# Patient Record
Sex: MALE | Race: WHITE | NOT HISPANIC OR LATINO | Employment: FULL TIME | URBAN - METROPOLITAN AREA
[De-identification: names, ages, dates, MRNs, and addresses within clinical notes are randomized per-mention and may not be internally consistent; named-entity substitution may affect disease eponyms.]

---

## 2023-03-10 DIAGNOSIS — F41.8 ANXIOUS DEPRESSION: Primary | ICD-10-CM

## 2023-03-10 RX ORDER — PAROXETINE HYDROCHLORIDE 20 MG/1
TABLET, FILM COATED ORAL
Qty: 90 TABLET | Refills: 3 | Status: SHIPPED | OUTPATIENT
Start: 2023-03-10 | End: 2024-03-04

## 2023-03-24 DIAGNOSIS — K21.00 GASTROESOPHAGEAL REFLUX DISEASE WITH ESOPHAGITIS WITHOUT HEMORRHAGE: Primary | ICD-10-CM

## 2023-03-24 RX ORDER — PANTOPRAZOLE SODIUM 40 MG/1
TABLET, DELAYED RELEASE ORAL
Qty: 90 TABLET | Refills: 3 | Status: SHIPPED | OUTPATIENT
Start: 2023-03-24 | End: 2024-06-03

## 2023-03-26 PROBLEM — L70.9 ADULT ACNE: Status: ACTIVE | Noted: 2023-03-26

## 2023-03-26 PROBLEM — A08.39 GASTROENTERITIS DUE TO COVID-19 VIRUS: Status: ACTIVE | Noted: 2023-03-26

## 2023-03-26 PROBLEM — J32.9 SINOBRONCHITIS: Status: ACTIVE | Noted: 2023-03-26

## 2023-03-26 PROBLEM — J31.0 CHRONIC RHINITIS: Status: ACTIVE | Noted: 2023-03-26

## 2023-03-26 PROBLEM — J04.0 LARYNGITIS: Status: ACTIVE | Noted: 2023-03-26

## 2023-03-26 PROBLEM — R63.5 WEIGHT GAIN: Status: ACTIVE | Noted: 2023-03-26

## 2023-03-26 PROBLEM — U07.1 GASTROENTERITIS DUE TO COVID-19 VIRUS: Status: ACTIVE | Noted: 2023-03-26

## 2023-03-26 PROBLEM — R05.8 ALLERGIC COUGH: Status: ACTIVE | Noted: 2023-03-26

## 2023-03-26 PROBLEM — B96.89 SINUSITIS, BACTERIAL: Status: ACTIVE | Noted: 2023-03-26

## 2023-03-26 PROBLEM — M79.10 MYALGIA: Status: ACTIVE | Noted: 2023-03-26

## 2023-03-26 PROBLEM — F17.200 TOBACCO DEPENDENCE: Status: ACTIVE | Noted: 2023-03-26

## 2023-03-26 PROBLEM — R11.2 NAUSEA, VOMITING, AND DIARRHEA: Status: ACTIVE | Noted: 2023-03-26

## 2023-03-26 PROBLEM — E86.0 DEHYDRATION, MODERATE: Status: ACTIVE | Noted: 2023-03-26

## 2023-03-26 PROBLEM — E78.5 HYPERLIPIDEMIA: Status: ACTIVE | Noted: 2023-03-26

## 2023-03-26 PROBLEM — I10 HYPERTENSION: Status: ACTIVE | Noted: 2023-03-26

## 2023-03-26 PROBLEM — K12.0 APHTHOUS ULCER OF TONGUE: Status: ACTIVE | Noted: 2023-03-26

## 2023-03-26 PROBLEM — M54.12 CERVICAL NEURITIS: Status: ACTIVE | Noted: 2023-03-26

## 2023-03-26 PROBLEM — L21.9 SEBORRHEIC DERMATITIS OF SCALP: Status: ACTIVE | Noted: 2023-03-26

## 2023-03-26 PROBLEM — R00.0 SINUS TACHYCARDIA: Status: ACTIVE | Noted: 2023-03-26

## 2023-03-26 PROBLEM — K21.9 GERD (GASTROESOPHAGEAL REFLUX DISEASE): Status: ACTIVE | Noted: 2023-03-26

## 2023-03-26 PROBLEM — R53.83 FATIGUE: Status: ACTIVE | Noted: 2023-03-26

## 2023-03-26 PROBLEM — J30.9 ALLERGIC RHINITIS: Status: ACTIVE | Noted: 2023-03-26

## 2023-03-26 PROBLEM — E55.9 VITAMIN D DEFICIENCY: Status: ACTIVE | Noted: 2023-03-26

## 2023-03-26 PROBLEM — J40 SINOBRONCHITIS: Status: ACTIVE | Noted: 2023-03-26

## 2023-03-26 PROBLEM — S46.911A SHOULDER STRAIN, RIGHT, INITIAL ENCOUNTER: Status: ACTIVE | Noted: 2023-03-26

## 2023-03-26 PROBLEM — J42 CHRONIC BRONCHITIS (MULTI): Status: ACTIVE | Noted: 2023-03-26

## 2023-03-26 PROBLEM — T63.461A WASP STING: Status: ACTIVE | Noted: 2023-03-26

## 2023-03-26 PROBLEM — F32.A DEPRESSION: Status: ACTIVE | Noted: 2023-03-26

## 2023-03-26 PROBLEM — R19.7 NAUSEA, VOMITING, AND DIARRHEA: Status: ACTIVE | Noted: 2023-03-26

## 2023-03-26 PROBLEM — M77.8 CAPSULITIS OF RIGHT SHOULDER: Status: ACTIVE | Noted: 2023-03-26

## 2023-03-26 PROBLEM — J32.9 SINUSITIS, BACTERIAL: Status: ACTIVE | Noted: 2023-03-26

## 2023-03-26 RX ORDER — ONDANSETRON 4 MG/1
1-2 TABLET, FILM COATED ORAL AS NEEDED
COMMUNITY
Start: 2022-10-06

## 2023-03-26 RX ORDER — METOPROLOL SUCCINATE 25 MG/1
1 TABLET, EXTENDED RELEASE ORAL DAILY
COMMUNITY
Start: 2015-11-19 | End: 2023-05-09

## 2023-03-26 RX ORDER — CLINDAMYCIN PHOSPHATE AND BENZOYL PEROXIDE 10; 50 MG/G; MG/G
GEL TOPICAL 2 TIMES DAILY
COMMUNITY
Start: 2021-09-02

## 2023-03-26 RX ORDER — NEOMYCIN SULFATE, POLYMYXIN B SULFATE AND DEXAMETHASONE 3.5; 10000; 1 MG/ML; [USP'U]/ML; MG/ML
2 SUSPENSION/ DROPS OPHTHALMIC 2 TIMES DAILY
COMMUNITY
Start: 2022-07-25 | End: 2023-03-30 | Stop reason: ALTCHOICE

## 2023-03-26 RX ORDER — AMLODIPINE AND BENAZEPRIL HYDROCHLORIDE 5; 20 MG/1; MG/1
1 CAPSULE ORAL DAILY
COMMUNITY
Start: 2014-07-03 | End: 2023-05-09

## 2023-03-26 RX ORDER — FLUTICASONE PROPIONATE 50 MCG
2 SPRAY, SUSPENSION (ML) NASAL DAILY
COMMUNITY
Start: 2020-08-24 | End: 2023-03-31 | Stop reason: SDUPTHER

## 2023-03-26 RX ORDER — DEXAMETHASONE 6 MG/1
6 TABLET ORAL 2 TIMES DAILY
COMMUNITY
Start: 2022-10-06 | End: 2023-09-27 | Stop reason: ALTCHOICE

## 2023-03-26 RX ORDER — ERGOCALCIFEROL 1.25 MG/1
1 CAPSULE ORAL
COMMUNITY

## 2023-03-30 ENCOUNTER — OFFICE VISIT (OUTPATIENT)
Dept: PRIMARY CARE | Facility: CLINIC | Age: 53
End: 2023-03-30
Payer: COMMERCIAL

## 2023-03-30 VITALS
DIASTOLIC BLOOD PRESSURE: 88 MMHG | BODY MASS INDEX: 42.21 KG/M2 | WEIGHT: 285 LBS | SYSTOLIC BLOOD PRESSURE: 140 MMHG | HEART RATE: 94 BPM | OXYGEN SATURATION: 95 % | RESPIRATION RATE: 18 BRPM | HEIGHT: 69 IN | TEMPERATURE: 97.9 F

## 2023-03-30 DIAGNOSIS — J32.9 SINOBRONCHITIS: ICD-10-CM

## 2023-03-30 DIAGNOSIS — J01.01 ACUTE RECURRENT MAXILLARY SINUSITIS: ICD-10-CM

## 2023-03-30 DIAGNOSIS — F17.200 TOBACCO DEPENDENCE: ICD-10-CM

## 2023-03-30 DIAGNOSIS — J30.9 ALLERGIC RHINITIS, UNSPECIFIED SEASONALITY, UNSPECIFIED TRIGGER: ICD-10-CM

## 2023-03-30 DIAGNOSIS — J40 SINOBRONCHITIS: ICD-10-CM

## 2023-03-30 DIAGNOSIS — M54.12 CERVICAL NEURITIS: ICD-10-CM

## 2023-03-30 DIAGNOSIS — J40 BRONCHITIS: ICD-10-CM

## 2023-03-30 DIAGNOSIS — H60.311 CHRONIC DIFFUSE OTITIS EXTERNA OF RIGHT EAR: Primary | ICD-10-CM

## 2023-03-30 DIAGNOSIS — J41.0 SIMPLE CHRONIC BRONCHITIS (MULTI): ICD-10-CM

## 2023-03-30 DIAGNOSIS — R05.8 ALLERGIC COUGH: ICD-10-CM

## 2023-03-30 DIAGNOSIS — J31.0 CHRONIC RHINITIS: ICD-10-CM

## 2023-03-30 DIAGNOSIS — K21.00 GASTROESOPHAGEAL REFLUX DISEASE WITH ESOPHAGITIS WITHOUT HEMORRHAGE: ICD-10-CM

## 2023-03-30 PROCEDURE — 3077F SYST BP >= 140 MM HG: CPT | Performed by: FAMILY MEDICINE

## 2023-03-30 PROCEDURE — 96372 THER/PROPH/DIAG INJ SC/IM: CPT | Performed by: FAMILY MEDICINE

## 2023-03-30 PROCEDURE — 99214 OFFICE O/P EST MOD 30 MIN: CPT | Performed by: FAMILY MEDICINE

## 2023-03-30 PROCEDURE — 3079F DIAST BP 80-89 MM HG: CPT | Performed by: FAMILY MEDICINE

## 2023-03-30 RX ORDER — CIPROFLOXACIN AND DEXAMETHASONE 3; 1 MG/ML; MG/ML
4 SUSPENSION/ DROPS AURICULAR (OTIC) 2 TIMES DAILY
Qty: 7.5 ML | Refills: 0 | Status: SHIPPED | OUTPATIENT
Start: 2023-03-30 | End: 2023-03-31 | Stop reason: SDUPTHER

## 2023-03-30 RX ORDER — MOMETASONE FUROATE 50 UG/1
1 SPRAY, METERED NASAL 2 TIMES DAILY
Qty: 17 G | Refills: 3 | Status: SHIPPED | OUTPATIENT
Start: 2023-03-30 | End: 2023-09-27 | Stop reason: SDUPTHER

## 2023-03-30 RX ORDER — CEPHALEXIN 500 MG/1
500 CAPSULE ORAL 2 TIMES DAILY
Qty: 20 CAPSULE | Refills: 0 | Status: SHIPPED | OUTPATIENT
Start: 2023-03-30 | End: 2023-03-31 | Stop reason: SDUPTHER

## 2023-03-30 RX ORDER — ACETAMINOPHEN 160 MG
TABLET,CHEWABLE ORAL
COMMUNITY

## 2023-03-30 ASSESSMENT — PAIN SCALES - GENERAL: PAINLEVEL: 6

## 2023-03-30 NOTE — PROGRESS NOTES
Subjective   Julian Morgan is a 52 y.o. male who presents for Follow-up.  HPI  ; patient is a 52-year-old male who has chronic sinus problems and upper respiratory congestion with cough.  He also states his right ear has been hurting and he was hoping to get some eardrops.  Patient uses Nasonex nasal mist but needs a refill on the prescription for his chronic sinus problems.  He also was hoping to get an allergy shot since the weather change affects his sinuses and seasonal allergies.  Patient works in a [employer] as a  and he does have chronic sinusitis and constant upper respiratory lung congestion.  He does have an albuterol inhaler at home that he uses periodically.  Patient is also a smoker and I have encouraged him to try to quit.        Objective ROS  ;10 systems were reviewed and the information is included in the HPI and no additional review of systems is indicated.    Physical Exam  Vitals and nursing note reviewed.   Constitutional:       Appearance: Normal appearance. He is normal weight.   HENT:      Head: Normocephalic.      Right Ear: External ear normal.      Left Ear: Tympanic membrane and external ear normal.      Ears:      Comments: Right external canal is irritated and the right TM is also inflamed.     Nose: Congestion present.      Comments: Nasal mucosal congestion watery drainage.     Mouth/Throat:      Mouth: Mucous membranes are moist.      Pharynx: Oropharynx is clear.      Comments: Posterior pharyngeal erythema and post sinus drainage.  Eyes:      Extraocular Movements: Extraocular movements intact.      Conjunctiva/sclera: Conjunctivae normal.      Pupils: Pupils are equal, round, and reactive to light.   Neck:      Comments: Cervical neck spasm and restriction of motion.  Bilateral cervical muscle spasm.  Cardiovascular:      Rate and Rhythm: Normal rate and regular rhythm.      Pulses: Normal pulses.      Heart sounds: Normal heart sounds.      Comments:  Heart rhythm is stable S1 and S2 are noted, no ectopics.  Pulmonary:      Effort: Pulmonary effort is normal.      Breath sounds: Rhonchi present.      Comments: Bilateral rhonchi due to sinus drainage and also tobacco.  Abdominal:      General: Bowel sounds are normal.      Palpations: Abdomen is soft.      Comments: Moderately obese and nontender to palpation.  Patient does like his beer.   Genitourinary:     Comments: Not examined  Musculoskeletal:         General: Tenderness present. Normal range of motion.      Cervical back: Normal range of motion. Tenderness present.      Comments: Patient has arthritis in all his joints.  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  He also has restricted range of motion lumbar spine due to DJD and spasm.  No ankle edema.   Skin:     General: Skin is warm.   Neurological:      General: No focal deficit present.      Mental Status: He is alert and oriented to person, place, and time. Mental status is at baseline.      Comments: Occasional sciatica pain from low back spasm.  No neurosensory deficits in the upper extremities.   Psychiatric:         Mood and Affect: Mood normal.         Behavior: Behavior normal.         Thought Content: Thought content normal.         Judgment: Judgment normal.      Comments: Patient has a flat affect but normal mood.  Normal judgment and good thought process.  Pleasant when you talk to him.  He is shy.     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  He was given 4 mg dexamethasone IM for his rhinitis, he was started on antibiotics for sinusitis.  He also was given some eardrops for right otitis externa.  Patient needed a prescription for his Nasonex nose spray.  He does suffer with hypertension as well and he tries to watch his weight.   Patient will have his blood work rechecked next visit and he will follow-up in 3 or 4 months or sooner if needed.    Problem List Items Addressed This Visit    None  Visit Diagnoses       Chronic diffuse otitis externa of right ear    -  Primary    Relevant Medications    ciprofloxacin-dexamethasone (CiproDEX) otic suspension    Acute recurrent maxillary sinusitis        Relevant Medications    mometasone (Nasonex) 50 mcg/actuation nasal spray    cephalexin (Keflex) 500 mg capsule                 Jose Luis Cuenca, DO

## 2023-03-31 ENCOUNTER — TELEPHONE (OUTPATIENT)
Dept: PRIMARY CARE | Facility: CLINIC | Age: 53
End: 2023-03-31
Payer: COMMERCIAL

## 2023-03-31 DIAGNOSIS — H60.311 CHRONIC DIFFUSE OTITIS EXTERNA OF RIGHT EAR: ICD-10-CM

## 2023-03-31 DIAGNOSIS — J01.01 ACUTE RECURRENT MAXILLARY SINUSITIS: ICD-10-CM

## 2023-03-31 RX ORDER — CEPHALEXIN 500 MG/1
500 CAPSULE ORAL 2 TIMES DAILY
Qty: 20 CAPSULE | Refills: 0 | Status: SHIPPED | OUTPATIENT
Start: 2023-03-31 | End: 2023-04-10

## 2023-03-31 RX ORDER — CIPROFLOXACIN AND DEXAMETHASONE 3; 1 MG/ML; MG/ML
4 SUSPENSION/ DROPS AURICULAR (OTIC) 2 TIMES DAILY
Qty: 7.5 ML | Refills: 0 | Status: SHIPPED | OUTPATIENT
Start: 2023-03-31 | End: 2023-04-07

## 2023-03-31 RX ORDER — FLUTICASONE PROPIONATE 50 MCG
2 SPRAY, SUSPENSION (ML) NASAL DAILY
Qty: 16 G | Refills: 3 | Status: SHIPPED | OUTPATIENT
Start: 2023-03-31

## 2023-05-09 DIAGNOSIS — I10 PRIMARY HYPERTENSION: Primary | ICD-10-CM

## 2023-05-09 RX ORDER — AMLODIPINE AND BENAZEPRIL HYDROCHLORIDE 5; 20 MG/1; MG/1
CAPSULE ORAL
Qty: 90 CAPSULE | Refills: 3 | Status: SHIPPED | OUTPATIENT
Start: 2023-05-09 | End: 2024-05-03

## 2023-05-09 RX ORDER — METOPROLOL SUCCINATE 25 MG/1
TABLET, EXTENDED RELEASE ORAL
Qty: 90 TABLET | Refills: 3 | Status: SHIPPED | OUTPATIENT
Start: 2023-05-09 | End: 2024-05-03

## 2023-09-27 ENCOUNTER — OFFICE VISIT (OUTPATIENT)
Dept: PRIMARY CARE | Facility: CLINIC | Age: 53
End: 2023-09-27
Payer: COMMERCIAL

## 2023-09-27 VITALS
BODY MASS INDEX: 41.92 KG/M2 | RESPIRATION RATE: 18 BRPM | HEIGHT: 69 IN | SYSTOLIC BLOOD PRESSURE: 148 MMHG | HEART RATE: 99 BPM | OXYGEN SATURATION: 95 % | WEIGHT: 283 LBS | DIASTOLIC BLOOD PRESSURE: 90 MMHG | TEMPERATURE: 98.3 F

## 2023-09-27 DIAGNOSIS — B37.9 YEAST INFECTION: ICD-10-CM

## 2023-09-27 DIAGNOSIS — J40 SINOBRONCHITIS: ICD-10-CM

## 2023-09-27 DIAGNOSIS — E55.9 VITAMIN D DEFICIENCY: ICD-10-CM

## 2023-09-27 DIAGNOSIS — K21.00 GASTROESOPHAGEAL REFLUX DISEASE WITH ESOPHAGITIS WITHOUT HEMORRHAGE: ICD-10-CM

## 2023-09-27 DIAGNOSIS — J32.9 SINOBRONCHITIS: ICD-10-CM

## 2023-09-27 DIAGNOSIS — Z00.00 PHYSICAL EXAM, ANNUAL: Primary | ICD-10-CM

## 2023-09-27 DIAGNOSIS — E66.01 CLASS 3 SEVERE OBESITY DUE TO EXCESS CALORIES WITHOUT SERIOUS COMORBIDITY WITH BODY MASS INDEX (BMI) OF 40.0 TO 44.9 IN ADULT (MULTI): ICD-10-CM

## 2023-09-27 DIAGNOSIS — J01.01 ACUTE RECURRENT MAXILLARY SINUSITIS: ICD-10-CM

## 2023-09-27 DIAGNOSIS — I10 PRIMARY HYPERTENSION: ICD-10-CM

## 2023-09-27 DIAGNOSIS — M53.86 SCIATICA ASSOCIATED WITH DISORDER OF LUMBAR SPINE: ICD-10-CM

## 2023-09-27 DIAGNOSIS — J41.1 MUCOPURULENT CHRONIC BRONCHITIS (MULTI): ICD-10-CM

## 2023-09-27 PROBLEM — E66.813 CLASS 3 SEVERE OBESITY DUE TO EXCESS CALORIES WITHOUT SERIOUS COMORBIDITY WITH BODY MASS INDEX (BMI) OF 40.0 TO 44.9 IN ADULT: Status: ACTIVE | Noted: 2023-09-27

## 2023-09-27 LAB
ALANINE AMINOTRANSFERASE (SGPT) (U/L) IN SER/PLAS: 18 U/L (ref 10–52)
ALBUMIN (G/DL) IN SER/PLAS: 4.1 G/DL (ref 3.4–5)
ALKALINE PHOSPHATASE (U/L) IN SER/PLAS: 67 U/L (ref 33–120)
ANION GAP IN SER/PLAS: 14 MMOL/L (ref 10–20)
APPEARANCE UR: CLEAR
ASPARTATE AMINOTRANSFERASE (SGOT) (U/L) IN SER/PLAS: 37 U/L (ref 9–39)
BILIRUB UR QL STRIP: NEGATIVE
BILIRUBIN TOTAL (MG/DL) IN SER/PLAS: 0.8 MG/DL (ref 0–1.2)
CALCIDIOL (25 OH VITAMIN D3) (NG/ML) IN SER/PLAS: 28 NG/ML
CALCIUM (MG/DL) IN SER/PLAS: 9.9 MG/DL (ref 8.6–10.6)
CARBON DIOXIDE, TOTAL (MMOL/L) IN SER/PLAS: 30 MMOL/L (ref 21–32)
CHLORIDE (MMOL/L) IN SER/PLAS: 95 MMOL/L (ref 98–107)
CHOLESTEROL (MG/DL) IN SER/PLAS: 221 MG/DL (ref 0–199)
CHOLESTEROL IN HDL (MG/DL) IN SER/PLAS: 59.2 MG/DL
CHOLESTEROL/HDL RATIO: 3.7
COLOR UR: YELLOW
CREATININE (MG/DL) IN SER/PLAS: 0.94 MG/DL (ref 0.5–1.3)
ERYTHROCYTE DISTRIBUTION WIDTH (RATIO) BY AUTOMATED COUNT: 13.2 % (ref 11.5–14.5)
ERYTHROCYTE MEAN CORPUSCULAR HEMOGLOBIN CONCENTRATION (G/DL) BY AUTOMATED: 32.9 G/DL (ref 32–36)
ERYTHROCYTE MEAN CORPUSCULAR VOLUME (FL) BY AUTOMATED COUNT: 101 FL (ref 80–100)
ERYTHROCYTES (10*6/UL) IN BLOOD BY AUTOMATED COUNT: 4.99 X10E12/L (ref 4.5–5.9)
GFR MALE: >90 ML/MIN/1.73M2
GLUCOSE (MG/DL) IN SER/PLAS: 100 MG/DL (ref 74–99)
GLUCOSE UR STRIP-MCNC: NEGATIVE MG/DL
HEMATOCRIT (%) IN BLOOD BY AUTOMATED COUNT: 50.2 % (ref 41–52)
HEMOGLOBIN (G/DL) IN BLOOD: 16.5 G/DL (ref 13.5–17.5)
HGB UR QL STRIP: NEGATIVE
KETONES UR STRIP-MCNC: NEGATIVE MG/DL
LDL: 149 MG/DL (ref 0–99)
LEUKOCYTE ESTERASE UR QL STRIP: NEGATIVE
LEUKOCYTES (10*3/UL) IN BLOOD BY AUTOMATED COUNT: 8.9 X10E9/L (ref 4.4–11.3)
NITRITE UR QL STRIP: NEGATIVE
NRBC (PER 100 WBCS) BY AUTOMATED COUNT: 0 /100 WBC (ref 0–0)
PH UR STRIP: 6 [PH]
PLATELETS (10*3/UL) IN BLOOD AUTOMATED COUNT: 295 X10E9/L (ref 150–450)
POTASSIUM (MMOL/L) IN SER/PLAS: 4.8 MMOL/L (ref 3.5–5.3)
PROSTATE SPECIFIC ANTIGEN,SCREEN: 0.63 NG/ML (ref 0–4)
PROT UR STRIP-MCNC: NEGATIVE MG/DL
PROTEIN TOTAL: 7.3 G/DL (ref 6.4–8.2)
SODIUM (MMOL/L) IN SER/PLAS: 134 MMOL/L (ref 136–145)
SP GR UR STRIP.AUTO: 1.01
THYROTROPIN (MIU/L) IN SER/PLAS BY DETECTION LIMIT <= 0.05 MIU/L: 0.76 MIU/L (ref 0.44–3.98)
TRIGLYCERIDE (MG/DL) IN SER/PLAS: 62 MG/DL (ref 0–149)
UREA NITROGEN (MG/DL) IN SER/PLAS: 11 MG/DL (ref 6–23)
UROBILINOGEN UR STRIP-ACNC: 0.2 E.U./DL
VLDL: 12 MG/DL (ref 0–40)

## 2023-09-27 PROCEDURE — 93000 ELECTROCARDIOGRAM COMPLETE: CPT | Performed by: FAMILY MEDICINE

## 2023-09-27 PROCEDURE — 3008F BODY MASS INDEX DOCD: CPT | Performed by: FAMILY MEDICINE

## 2023-09-27 PROCEDURE — 80053 COMPREHEN METABOLIC PANEL: CPT

## 2023-09-27 PROCEDURE — 3080F DIAST BP >= 90 MM HG: CPT | Performed by: FAMILY MEDICINE

## 2023-09-27 PROCEDURE — 99396 PREV VISIT EST AGE 40-64: CPT | Performed by: FAMILY MEDICINE

## 2023-09-27 PROCEDURE — 81003 URINALYSIS AUTO W/O SCOPE: CPT | Performed by: FAMILY MEDICINE

## 2023-09-27 PROCEDURE — 84443 ASSAY THYROID STIM HORMONE: CPT

## 2023-09-27 PROCEDURE — 84153 ASSAY OF PSA TOTAL: CPT

## 2023-09-27 PROCEDURE — 82306 VITAMIN D 25 HYDROXY: CPT

## 2023-09-27 PROCEDURE — 3077F SYST BP >= 140 MM HG: CPT | Performed by: FAMILY MEDICINE

## 2023-09-27 PROCEDURE — 80061 LIPID PANEL: CPT

## 2023-09-27 PROCEDURE — 85027 COMPLETE CBC AUTOMATED: CPT

## 2023-09-27 RX ORDER — CLOTRIMAZOLE AND BETAMETHASONE DIPROPIONATE 10; .64 MG/G; MG/G
1 CREAM TOPICAL 2 TIMES DAILY
Qty: 45 G | Refills: 3 | Status: SHIPPED | OUTPATIENT
Start: 2023-09-27 | End: 2023-09-27

## 2023-09-27 RX ORDER — CLOTRIMAZOLE AND BETAMETHASONE DIPROPIONATE 10; .64 MG/G; MG/G
1 CREAM TOPICAL 2 TIMES DAILY
Qty: 45 G | Refills: 3 | Status: SHIPPED | OUTPATIENT
Start: 2023-09-27 | End: 2023-11-26

## 2023-09-27 RX ORDER — MOMETASONE FUROATE 50 UG/1
1 SPRAY, METERED NASAL 2 TIMES DAILY
Qty: 17 G | Refills: 3 | Status: SHIPPED | OUTPATIENT
Start: 2023-09-27 | End: 2024-09-26

## 2023-09-27 RX ORDER — OMEPRAZOLE 40 MG/1
40 CAPSULE, DELAYED RELEASE ORAL
Qty: 90 CAPSULE | Refills: 3 | Status: SHIPPED | OUTPATIENT
Start: 2023-09-27 | End: 2024-09-26

## 2023-09-27 RX ORDER — AMOXICILLIN AND CLAVULANATE POTASSIUM 500; 125 MG/1; MG/1
500 TABLET, FILM COATED ORAL 3 TIMES DAILY
Qty: 30 TABLET | Refills: 0 | Status: SHIPPED | OUTPATIENT
Start: 2023-09-27 | End: 2023-10-07

## 2023-09-27 ASSESSMENT — PAIN SCALES - GENERAL: PAINLEVEL: 8

## 2023-09-27 ASSESSMENT — PATIENT HEALTH QUESTIONNAIRE - PHQ9
SUM OF ALL RESPONSES TO PHQ9 QUESTIONS 1 AND 2: 0
2. FEELING DOWN, DEPRESSED OR HOPELESS: NOT AT ALL
1. LITTLE INTEREST OR PLEASURE IN DOING THINGS: NOT AT ALL

## 2023-09-27 NOTE — PROGRESS NOTES
Subjective   Julian Morgan is a 53 y.o. male who presents for Annual Exam (Patient here for annual physical exam today).    HPI  : Patient is a 53-year-old male who is in for a physical exam.  Patient will give us a urine specimen, have an EKG and complete blood work.  He also is complaining of sinus congestion and cough.  Patient does have chronic bronchitis and chronic sinus issues.  He also has low back pain and some left sciatica.  He was hoping to get a cortisone shot for his sciatica and he also needs refill on several of his medications.  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  He has had nerve blocks in the past and may need to go back for more nerve blocks if his back continues to bother him.  He did not want any spinal surgery at that time.      Objective  : ROS : 10 systems were reviewed and the information is included in the HPI and no additional review of systems is indicated.    Physical Exam  Vitals and nursing note reviewed.   Constitutional:       Appearance: Normal appearance. He is obese.      Comments: Patient is alert and oriented x3.      HENT:      Head: Normocephalic.      Right Ear: Tympanic membrane and external ear normal.      Left Ear: Tympanic membrane and external ear normal.      Ears:      Comments: Ears are patent bilaterally and TMs are clear.     Nose: Congestion present.      Comments: Nasal mucosal congestion and postnasal drainage.  Chronic sinus problems which affect his breathing.     Mouth/Throat:      Mouth: Mucous membranes are dry.      Comments: Mouth is dry, tongue is midline.  Moderate  posterior pharyngeal erythema and post sinus drainage in the posterior pharynx.  Eyes:      Extraocular Movements: Extraocular movements intact.      Conjunctiva/sclera: Conjunctivae normal.      Pupils: Pupils are equal, round, and reactive to light.      Comments: Patient denies  visual disturbance but does have itchy eyes   from allergies, and needs an eye exam soon.   Neck:      Comments: Patient has a short stout neck with restriction of motion.  No carotid bruits, no thyromegaly, no cervical adenopathy.    Cardiovascular:      Rate and Rhythm: Regular rhythm. Tachycardia present.      Pulses: Normal pulses.      Heart sounds: Normal heart sounds.      Comments: Patient does develop some rib pain from coughing.  Denies any substernal chest pain.  Also is on 2 medications for hypertension.  He will have an EKG today.  Pulmonary:      Effort: Pulmonary effort is normal.      Breath sounds: Wheezing and rhonchi present.      Comments: Patient does have scattered rhonchi and expiratory wheezes and a cough with deep inspiration.  He is a tobacco user.  Abdominal:      General: Bowel sounds are normal.      Palpations: Abdomen is soft.      Comments: Abdomen is soft and obese and difficult to evaluate due to obesity.  He does have chronic reflux esophagitis.  States the Protonix is not helping and would like to try Nexium.  Denies any abdominal guarding or rebound tenderness.  Denies diarrhea.   Genitourinary:     Comments: Patient denies dysuria, no hematuria,  denies flank pain.  Occasional nocturia.  Musculoskeletal:         General: Tenderness present.      Comments: Patient does strenuous labor at work as a  and has chronic lumbosacral disc problems.  He has had nerve blocks in the past and may need to be sent for more soon.  He has lumbosacral disc disease with left sciatica..  Osteoarthritis of the hips and knees.   Skin:     General: Skin is warm and dry.      Findings: Rash (Yeast type infection on his feet.) present.      Comments: Patient has a yeast type dermatitis on his feet.  He will be prescribed Lotrisone cream.     Neurological:      General: No focal deficit present.      Mental Status: He is alert and oriented to person, place, and time. Mental status is at  baseline.      Sensory: Sensory deficit present.      Comments: Lumbosacral disc disease with left sciatica.  Paresthesias intermittently in both legs.   coordination and gait are stable.  Normal muscle strength upper and lower extremities.   Psychiatric:         Behavior: Behavior normal.         Thought Content: Thought content normal.         Judgment: Judgment normal.      Comments: Patient has flat affect, denies anxiety or depression.  Thought content and judgment are stable.  No signs of vascular dementia.  Behavior is normal.     PLAN : Patient is a 53-year-old male who was evaluated today for a annual physical.  Urine showed a pH of 6.0, specific gravity 1.015, negative leukocytes, negative protein, negative blood, negative glucose.  EKG showed sinus tachycardia at a rate of 100, no acute changes are noted, nonspecific ST-T wave changes normal ECG.  Patient had his blood work drawn and will be notified of the results in 4 days and further recommendations will be made at that time.  He also received 4 mg dexamethasone +2 mL lidocaine in the left lumbosacral trigger point.  Patient was also prescribed antibiotics for his sinusitis and bronchitis.  He needed refills on some of his medications and he was also given Lotrisone cream for the yeast infection on his feet.  Patient will also try omeprazole 40 mg daily for his gastroesophageal reflux disease.  Further recommendations will be made pending the blood work results.    Problem List Items Addressed This Visit       Chronic bronchitis (CMS/HCC)    GERD (gastroesophageal reflux disease)    Relevant Medications    omeprazole (PriLOSEC) 40 mg DR capsule    Hypertension    Vitamin D deficiency    Relevant Orders    Vitamin D 25-Hydroxy,Total (for eval of Vitamin D levels)    Sinobronchitis    Acute recurrent maxillary sinusitis    Relevant Medications    amoxicillin-pot clavulanate (Augmentin) 500-125 mg tablet    mometasone (Nasonex) 50 mcg/actuation nasal  spray    Yeast infection    Relevant Medications    clotrimazole-betamethasone (Lotrisone) cream    Physical exam, annual - Primary    Relevant Orders    CBC    Comprehensive Metabolic Panel    Lipid Panel    POCT fingerstick glucose manually resulted    Prostate Specific Antigen, Screen    Thyroid Stimulating Hormone    POCT UA (Automated) docked device    ECG 12 Lead    Sciatica associated with disorder of lumbar spine            Jose Luis Cuenca, DO

## 2023-09-29 NOTE — RESULT ENCOUNTER NOTE
Cholesterol is 221 and should be under 200          bad cholesterol is 149 and should be under 100       triglycerides are normal at 62       blood sugar is normal at 100       kidney and liver function are normal     vitamin D is a little low at 28        it should be above 30       patient should take 3000 units of over-the-counter vitamin D daily           Red and white blood cell counts are normal         thyroid function is normal       Prostate level is normal at 0.63.  Blood work looks stable just try to watch the diet and the cholesterol.

## 2023-10-17 ENCOUNTER — OFFICE VISIT (OUTPATIENT)
Dept: PRIMARY CARE | Facility: CLINIC | Age: 53
End: 2023-10-17
Payer: COMMERCIAL

## 2023-10-17 VITALS
TEMPERATURE: 98.7 F | HEIGHT: 69 IN | DIASTOLIC BLOOD PRESSURE: 90 MMHG | OXYGEN SATURATION: 93 % | WEIGHT: 287 LBS | SYSTOLIC BLOOD PRESSURE: 152 MMHG | BODY MASS INDEX: 42.51 KG/M2 | RESPIRATION RATE: 20 BRPM | HEART RATE: 102 BPM

## 2023-10-17 DIAGNOSIS — J32.9 SINOBRONCHITIS: ICD-10-CM

## 2023-10-17 DIAGNOSIS — F17.200 TOBACCO DEPENDENCE: ICD-10-CM

## 2023-10-17 DIAGNOSIS — I10 PRIMARY HYPERTENSION: ICD-10-CM

## 2023-10-17 DIAGNOSIS — J40 BRONCHITIS: Primary | ICD-10-CM

## 2023-10-17 DIAGNOSIS — J40 SINOBRONCHITIS: ICD-10-CM

## 2023-10-17 DIAGNOSIS — E66.01 CLASS 3 SEVERE OBESITY DUE TO EXCESS CALORIES WITHOUT SERIOUS COMORBIDITY WITH BODY MASS INDEX (BMI) OF 40.0 TO 44.9 IN ADULT (MULTI): ICD-10-CM

## 2023-10-17 DIAGNOSIS — R53.83 FATIGUE, UNSPECIFIED TYPE: ICD-10-CM

## 2023-10-17 DIAGNOSIS — J18.0 BRONCHOPNEUMONIA: ICD-10-CM

## 2023-10-17 PROCEDURE — 99214 OFFICE O/P EST MOD 30 MIN: CPT | Performed by: FAMILY MEDICINE

## 2023-10-17 PROCEDURE — 3077F SYST BP >= 140 MM HG: CPT | Performed by: FAMILY MEDICINE

## 2023-10-17 PROCEDURE — 3008F BODY MASS INDEX DOCD: CPT | Performed by: FAMILY MEDICINE

## 2023-10-17 PROCEDURE — 96372 THER/PROPH/DIAG INJ SC/IM: CPT | Performed by: FAMILY MEDICINE

## 2023-10-17 PROCEDURE — 3080F DIAST BP >= 90 MM HG: CPT | Performed by: FAMILY MEDICINE

## 2023-10-17 RX ORDER — DOXYCYCLINE 100 MG/1
100 CAPSULE ORAL 2 TIMES DAILY
Qty: 20 CAPSULE | Refills: 0 | Status: SHIPPED | OUTPATIENT
Start: 2023-10-17 | End: 2023-10-27

## 2023-10-17 RX ORDER — CEFTRIAXONE 1 G/1
1 INJECTION, POWDER, FOR SOLUTION INTRAMUSCULAR; INTRAVENOUS ONCE
Status: COMPLETED | OUTPATIENT
Start: 2023-10-17 | End: 2023-10-17

## 2023-10-17 RX ORDER — GUAIFENESIN, PSEUDOEPHEDRINE HYDROCHLORIDE 600; 60 MG/1; MG/1
1 TABLET, EXTENDED RELEASE ORAL EVERY 12 HOURS
Qty: 20 TABLET | Refills: 1 | Status: SHIPPED | OUTPATIENT
Start: 2023-10-17 | End: 2023-11-16

## 2023-10-17 RX ADMIN — CEFTRIAXONE 1 G: 1 INJECTION, POWDER, FOR SOLUTION INTRAMUSCULAR; INTRAVENOUS at 18:05

## 2023-10-17 ASSESSMENT — PAIN SCALES - GENERAL: PAINLEVEL: 0-NO PAIN

## 2023-10-17 ASSESSMENT — PATIENT HEALTH QUESTIONNAIRE - PHQ9
1. LITTLE INTEREST OR PLEASURE IN DOING THINGS: NOT AT ALL
2. FEELING DOWN, DEPRESSED OR HOPELESS: NOT AT ALL
SUM OF ALL RESPONSES TO PHQ9 QUESTIONS 1 AND 2: 0

## 2023-10-17 NOTE — PROGRESS NOTES
Subjective   Julian Morgan is a 53 y.o. male who presents for Sick Visit (Patient here with complaint of chest congestion, SOB, cough, sore throat, runny nose. /Covid test negative).    HPI : Patient is a 53-year-old male who has been sick on and off for 3 weeks.  He was seen here in the office 3 weeks ago and treated for sinusitis with Augmentin.  He then felt a little better and went to the [facility] on  October 9 th,   and was given a prescription for prednisone and an albuterol inhaler.  He still did not feel well and came in today with coughing, congestion and shortness of breath.      Objective  : ROS : 10 systems were reviewed and the information is included in the HPI and no additional review of systems is indicated.    Physical Exam  Vitals and nursing note reviewed.   Constitutional:       Appearance: Normal appearance. He is obese.      Comments: Patient is alert and oriented x3.     HENT:      Head: Normocephalic.      Right Ear: External ear normal.      Left Ear: External ear normal.      Ears:      Comments: Congested TMs with erythema and air-fluid levels.     Nose: Congestion present.      Comments: Bilateral maxillary sinus congestion and drainage.     Mouth/Throat:      Mouth: Mucous membranes are dry.      Pharynx: Oropharynx is clear. Posterior oropharyngeal erythema present.      Comments: Mouth is dry,  tongue is midline.  Posterior pharyngeal erythema with post sinus drainage in the posterior pharynx.  Eyes:      Extraocular Movements: Extraocular movements intact.      Conjunctiva/sclera: Conjunctivae normal.      Pupils: Pupils are equal, round, and reactive to light.      Comments: Denies any visual disturbance but does have periorbital congestion from the sinus congestion.   Neck:      Comments: Broad  neck with restriction of motion due to body habitus.  No carotid bruits, no thyromegaly, no cervical adenopathy.    Cardiovascular:      Rate and Rhythm: Normal rate and regular rhythm.       Pulses: Normal pulses.      Heart sounds: Normal heart sounds.      Comments: Patient denies chest pain and no palpitations.  Heart rhythm is stable S1 and S2 are noted, no ectopics.  Pulmonary:      Effort: Pulmonary effort is normal.      Breath sounds: Wheezing and rhonchi present.      Comments: Lungs with bilateral expiratory wheezing and rhonchi in all lung fields.  Patient does have yellow-green mucus production.  Abdominal:      General: Bowel sounds are normal.      Palpations: Abdomen is soft.      Comments: Abdomen is soft and mildly obese and difficult to evaluate, nontender to palpation.  No flank tenderness.   Genitourinary:     Comments: Patient denies dysuria, no hematuria, denies flank pain.  Nocturia.  Musculoskeletal:         General: Tenderness present. Normal range of motion.      Comments: Patient has arthritis in the lumbar spine with degenerative disc disease.  Osteoarthritis of the hips and knees.  Ambulation is stable and gait is stable.   Skin:     General: Skin is warm and dry.      Comments: There is no bruising, no erythema, no skin lesions noted, no rashes.   Neurological:      General: No focal deficit present.      Mental Status: He is alert and oriented to person, place, and time. Mental status is at baseline.      Comments: No focal neurosensory deficits are noted.  Patient denies any peripheral neuropathy.  Coordination and gait are stable.  Normal muscle strength upper and lower extremities.   Psychiatric:         Behavior: Behavior normal.         Thought Content: Thought content normal.         Judgment: Judgment normal.      Comments: Patient has a flat affect and some anxiety since his bronchitis does not seem to be improving.  He also has not been able to go to work due to his current illness.  Thought content and judgment are stable.  No signs of vascular dementia.  Behavior is normal.     PLAN : Patient is a 53-year-old male who has been sick on and off for the past 3  weeks.  He initially was given some Augmentin 3 weeks ago and seemed to improve and then he seems to get sick again and went to a [facility] center on October 9.  He was given prednisone and an inhaler.  He made an appointment for today because he has not been able to go to work for the past 2 weeks due to illness.  He was given 1 g Rocephin IM for bronchial pneumonia and also prescribed Vibramycin 100 mg twice daily for 10 days and Mucinex D 1 every 12 hours.  He was also told to continue using his inhaler of albuterol and if there is no improvement he will need a chest x-ray.  Patient otherwise is stable and will follow-up as needed.   He also was encouraged to quit smoking.          Problem List Items Addressed This Visit       Sinobronchitis    Relevant Medications    pseudoephedrine-guaifenesin (Mucinex D)  mg 12 hr tablet     Other Visit Diagnoses       Bronchitis    -  Primary    Relevant Medications    doxycycline (Vibramycin) 100 mg capsule    Bronchopneumonia        Relevant Medications    cefTRIAXone (Rocephin) vial 1 g (Start on 10/17/2023  5:15 PM)                 Jose Luis Cuenca,

## 2023-10-17 NOTE — LETTER
October 17, 2023     Patient: Julian Morgan   YOB: 1970   Date of Visit: 10/17/2023       To Whom It May Concern:    Julian Morgan has been under my care for an illness from 10/9/2023.  It is my medical opinion that Julian Morgan should remain out of work until 10/19/2023 .    If you have any questions or concerns, please don't hesitate to call.         Sincerely,        Jose Luis Cuenca, DO

## 2023-12-06 ENCOUNTER — TELEPHONE (OUTPATIENT)
Dept: PRIMARY CARE | Facility: CLINIC | Age: 53
End: 2023-12-06
Payer: COMMERCIAL

## 2023-12-06 NOTE — TELEPHONE ENCOUNTER
Pt called asking for a call back from [employer]. Pt would not go into detail with me about the issue.   Dealing with his sinuses and FMLA forms

## 2024-01-04 ENCOUNTER — OFFICE VISIT (OUTPATIENT)
Dept: PRIMARY CARE | Facility: CLINIC | Age: 54
End: 2024-01-04
Payer: COMMERCIAL

## 2024-01-04 ENCOUNTER — APPOINTMENT (OUTPATIENT)
Dept: PRIMARY CARE | Facility: CLINIC | Age: 54
End: 2024-01-04
Payer: COMMERCIAL

## 2024-01-04 VITALS
DIASTOLIC BLOOD PRESSURE: 84 MMHG | BODY MASS INDEX: 42.06 KG/M2 | SYSTOLIC BLOOD PRESSURE: 134 MMHG | WEIGHT: 284 LBS | RESPIRATION RATE: 18 BRPM | HEART RATE: 99 BPM | OXYGEN SATURATION: 92 % | HEIGHT: 69 IN | TEMPERATURE: 98.7 F

## 2024-01-04 DIAGNOSIS — J01.01 ACUTE RECURRENT MAXILLARY SINUSITIS: Primary | ICD-10-CM

## 2024-01-04 DIAGNOSIS — F17.200 TOBACCO DEPENDENCE: ICD-10-CM

## 2024-01-04 DIAGNOSIS — J40 SINOBRONCHITIS: ICD-10-CM

## 2024-01-04 DIAGNOSIS — I10 PRIMARY HYPERTENSION: ICD-10-CM

## 2024-01-04 DIAGNOSIS — M47.26 OSTEOARTHRITIS OF SPINE WITH RADICULOPATHY, LUMBAR REGION: ICD-10-CM

## 2024-01-04 DIAGNOSIS — J32.9 SINOBRONCHITIS: ICD-10-CM

## 2024-01-04 DIAGNOSIS — E66.01 CLASS 3 SEVERE OBESITY DUE TO EXCESS CALORIES WITHOUT SERIOUS COMORBIDITY WITH BODY MASS INDEX (BMI) OF 40.0 TO 44.9 IN ADULT (MULTI): ICD-10-CM

## 2024-01-04 DIAGNOSIS — J41.1 MUCOPURULENT CHRONIC BRONCHITIS (MULTI): ICD-10-CM

## 2024-01-04 PROCEDURE — 99214 OFFICE O/P EST MOD 30 MIN: CPT | Performed by: FAMILY MEDICINE

## 2024-01-04 PROCEDURE — 3079F DIAST BP 80-89 MM HG: CPT | Performed by: FAMILY MEDICINE

## 2024-01-04 PROCEDURE — 3075F SYST BP GE 130 - 139MM HG: CPT | Performed by: FAMILY MEDICINE

## 2024-01-04 PROCEDURE — 3008F BODY MASS INDEX DOCD: CPT | Performed by: FAMILY MEDICINE

## 2024-01-04 RX ORDER — AMOXICILLIN AND CLAVULANATE POTASSIUM 875; 125 MG/1; MG/1
875 TABLET, FILM COATED ORAL 2 TIMES DAILY
Qty: 20 TABLET | Refills: 0 | Status: SHIPPED | OUTPATIENT
Start: 2024-01-04 | End: 2024-01-14

## 2024-01-04 ASSESSMENT — PATIENT HEALTH QUESTIONNAIRE - PHQ9
2. FEELING DOWN, DEPRESSED OR HOPELESS: NOT AT ALL
SUM OF ALL RESPONSES TO PHQ9 QUESTIONS 1 AND 2: 0
1. LITTLE INTEREST OR PLEASURE IN DOING THINGS: NOT AT ALL

## 2024-01-04 ASSESSMENT — PAIN SCALES - GENERAL: PAINLEVEL: 0-NO PAIN

## 2024-01-04 NOTE — PROGRESS NOTES
Subjective   Julian Morgan is a 53 y.o. male who presents for Follow-up (Follow up visit, patient complains of sinus problems and headaches).    HPI  : Patient is a 53-year-old male who comes in with sinus congestion and headaches.  He has chronic sinus problems and this does tend to develop from the bronchitis since he is a smoker.  I did have a long discussion with him concerning quitting tobacco.  He states he is trying to cut down but has had trouble quitting.    Objective  : ROS :10 systems were reviewed and the information is included in the HPI and no additional review of systems is indicated.    Physical Exam  Vitals and nursing note reviewed.   Constitutional:       Appearance: Normal appearance. He is normal weight.      Comments: Patient is alert and oriented x3.   No acute distress   HENT:      Head: Normocephalic.      Right Ear: Tympanic membrane and external ear normal.      Left Ear: Tympanic membrane and external ear normal.      Ears:      Comments: Ears are patent bilaterally and TMs are dull, but no infection.     Nose: Congestion present.      Comments: Sinus mucosal congestion and drainage.  Chronic postnasal drip.  Maxillary sinus tenderness.     Mouth/Throat:      Mouth: Mucous membranes are moist.      Pharynx: Oropharynx is clear. Posterior oropharyngeal erythema present.      Comments: Posterior pharyngeal erythema and post sinus drainage from sinusitis.  Eyes:      Extraocular Movements: Extraocular movements intact.      Conjunctiva/sclera: Conjunctivae normal.      Pupils: Pupils are equal, round, and reactive to light.      Comments: No visual disturbance, does have his  eyes examined once a year.   Neck:      Comments: Large broad neck with restriction of motion due to body habitus.  No carotid bruits, no thyromegaly, no cervical adenopathy.  Neck spasm and restriction of motion secondary to arthritis, stress and tension.  Cardiovascular:      Rate and Rhythm: Normal rate and regular  rhythm.      Pulses: Normal pulses.      Heart sounds: Normal heart sounds.      Comments: Patient denies chest pain and no palpitations.  Heart rhythm is stable S1 and S2 are noted, no ectopics.  Pulmonary:      Effort: Pulmonary effort is normal.      Breath sounds: Rhonchi present.      Comments: Bilateral rhonchi auscultation in all lung fields secondary to tobacco and chronic bronchitis.  Abdominal:      General: Bowel sounds are normal.      Palpations: Abdomen is soft.      Comments: Abdomen is soft and obese and difficult to evaluate due to obesity.  Denies any flank tenderness, no abdominal masses or guarding.   Genitourinary:     Comments: Patient denies dysuria, no hematuria, denies flank pain.  Patient does have nocturia.  Musculoskeletal:         General: Tenderness present.      Cervical back: Tenderness present.      Comments:   History of lumbosacral back problems but currently stable.  DJD of the hips and knees.    Restriction of motion cervical and lumbar spines due to arthritis, and muscle spasm.   Skin:     General: Skin is warm and dry.      Comments: Dry coarse skin from tobacco use.     Neurological:      General: No focal deficit present.      Mental Status: He is alert and oriented to person, place, and time. Mental status is at baseline.      Comments: Sinus headaches  .No focal neurosensory deficits are noted.  Patient denies any peripheral neuropathy.   Normal muscle strength upper and lower extremities.   Psychiatric:         Behavior: Behavior normal.         Thought Content: Thought content normal.         Judgment: Judgment normal.      Comments: Increased anxiety due to health issues.  In October he was sick for 2 weeks and had to miss work.  Thought content and judgment are stable.  No signs of vascular dementia.  Behavior is normal.     PLAN : Patient is a 53-year-old male who was evaluated for sinus problems and headaches.  He is a smoker and I have encouraged him to try to quit  smoking since he gets chronic bronchitis frequently.  He was treated for maxillary sinusitis with Augmentin and I did have a long discussion with him concerning smoking cessation.  Patient also inquiring about FMLA papers and I tried to explain the significance of this FMLA.  Patient will follow-up as needed and hopefully  tobacco use.          Problem List Items Addressed This Visit       Acute recurrent maxillary sinusitis - Primary    Relevant Medications    amoxicillin-pot clavulanate (Augmentin) 875-125 mg tablet            Jose Luis Cuenca, DO

## 2024-01-05 PROBLEM — M47.26 OSTEOARTHRITIS OF SPINE WITH RADICULOPATHY, LUMBAR REGION: Status: ACTIVE | Noted: 2024-01-05

## 2024-02-15 ENCOUNTER — OFFICE VISIT (OUTPATIENT)
Dept: PRIMARY CARE | Facility: CLINIC | Age: 54
End: 2024-02-15
Payer: COMMERCIAL

## 2024-02-15 VITALS
WEIGHT: 285 LBS | DIASTOLIC BLOOD PRESSURE: 92 MMHG | SYSTOLIC BLOOD PRESSURE: 140 MMHG | TEMPERATURE: 98.8 F | HEIGHT: 69 IN | BODY MASS INDEX: 42.21 KG/M2 | OXYGEN SATURATION: 92 % | HEART RATE: 98 BPM | RESPIRATION RATE: 18 BRPM

## 2024-02-15 DIAGNOSIS — F17.200 TOBACCO DEPENDENCE: ICD-10-CM

## 2024-02-15 DIAGNOSIS — J40 TRACHEOBRONCHITIS: ICD-10-CM

## 2024-02-15 DIAGNOSIS — J01.01 ACUTE RECURRENT MAXILLARY SINUSITIS: ICD-10-CM

## 2024-02-15 DIAGNOSIS — I10 PRIMARY HYPERTENSION: Primary | ICD-10-CM

## 2024-02-15 PROCEDURE — 3080F DIAST BP >= 90 MM HG: CPT | Performed by: FAMILY MEDICINE

## 2024-02-15 PROCEDURE — 3008F BODY MASS INDEX DOCD: CPT | Performed by: FAMILY MEDICINE

## 2024-02-15 PROCEDURE — 99214 OFFICE O/P EST MOD 30 MIN: CPT | Performed by: FAMILY MEDICINE

## 2024-02-15 PROCEDURE — 96372 THER/PROPH/DIAG INJ SC/IM: CPT | Performed by: FAMILY MEDICINE

## 2024-02-15 PROCEDURE — 3077F SYST BP >= 140 MM HG: CPT | Performed by: FAMILY MEDICINE

## 2024-02-15 RX ORDER — CIPROFLOXACIN 500 MG/1
500 TABLET ORAL 2 TIMES DAILY
Qty: 20 TABLET | Refills: 0 | Status: SHIPPED | OUTPATIENT
Start: 2024-02-15 | End: 2024-02-25

## 2024-02-15 RX ORDER — GUAIFENESIN, PSEUDOEPHEDRINE HYDROCHLORIDE 600; 60 MG/1; MG/1
1 TABLET, EXTENDED RELEASE ORAL DAILY
Qty: 10 TABLET | Refills: 0 | Status: SHIPPED | OUTPATIENT
Start: 2024-02-15 | End: 2025-02-14

## 2024-02-15 ASSESSMENT — PATIENT HEALTH QUESTIONNAIRE - PHQ9
2. FEELING DOWN, DEPRESSED OR HOPELESS: NOT AT ALL
1. LITTLE INTEREST OR PLEASURE IN DOING THINGS: NOT AT ALL
SUM OF ALL RESPONSES TO PHQ9 QUESTIONS 1 AND 2: 0

## 2024-02-15 ASSESSMENT — PAIN SCALES - GENERAL: PAINLEVEL: 0-NO PAIN

## 2024-02-15 NOTE — PROGRESS NOTES
Subjective   Julian Morgan is a 53 y.o. male who presents for Sick Visit (Patient here with complaint of sinus problems today).      HPI  : Patient is a 53-year-old male who is in with sinus congestion, sinus pressure and upper respiratory congestion and cough.  He is a smoker and I have tried to get him to quit tobacco.  If he cannot quit he should at least cut down.  He also works indoors and outdoors   and  is exposed  to the weather.  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    Objective  : ROS : 10 systems were reviewed and the information is included in the HPI and no additional review of systems is indicated.    Physical Exam  Vitals and nursing note reviewed.   Constitutional:       Appearance: Normal appearance. He is obese.      Comments: Patient is alert and oriented x3.   No acute distress  but  does  have  coughing and has sinus congestion with drainage.   HENT:      Head: Normocephalic.      Right Ear: Tympanic membrane and external ear normal.      Left Ear: Tympanic membrane and external ear normal.      Ears:      Comments: Ears are patent bilaterally and TMs are clear.     Nose: Congestion present.      Comments: Nasal mucus congestion and tenderness in the maxillary sinus area.  Postnasal drainage.     Mouth/Throat:      Mouth: Mucous membranes are dry.      Pharynx: Oropharynx is clear. Posterior oropharyngeal erythema present.      Comments: Mouth is dry and there is posterior pharyngeal erythema with post sinus drainage in the posterior pharynx.  Eyes:      Extraocular Movements: Extraocular movements intact.      Conjunctiva/sclera: Conjunctivae normal.      Pupils: Pupils are equal, round, and reactive to light.      Comments: No visual disturbance, does have his eyes examined once a year.   Neck:      Comments: Patient has restriction of motion cervical spine due to arthritis and body habitus.  He does have a large broad neck.  No carotid  bruits, no thyromegaly, no cervical adenopathy.    Cardiovascular:      Rate and Rhythm: Normal rate and regular rhythm.      Pulses: Normal pulses.      Heart sounds: Normal heart sounds.      Comments: Patient denies chest pain and no palpitations.  Heart rhythm is stable S1 and S2 are noted, no ectopics.  Pulmonary:      Effort: Pulmonary effort is normal.      Breath sounds: Rhonchi present.      Comments: Patient is a smoker and does have bilateral loose rhonchi in all lung fields.  No significant wheezing is noted.  Does have a cough with deep inspiration.  Abdominal:      General: Bowel sounds are normal.      Palpations: Abdomen is soft.      Comments: Soft abdomen moderately obese and no abdominal tenderness.  Patient does get diarrhea from certain antibiotics.  No guarding or rebound tenderness.  I did recommend that he try some probiotics.   Genitourinary:     Comments: Patient denies dysuria, no hematuria, denies flank pain.  Does have nocturia.  Musculoskeletal:         General: Tenderness present.      Cervical back: Tenderness present.      Right lower leg: Edema present.      Left lower leg: Edema present.      Comments: Patient has restriction of motion cervical thoracic and lumbar spines due to degenerative arthritis.  He does have some chronic lumbosacral disc disease with paresthesias into the buttocks.  Mild ankle edema is noted.  Osteoarthritis of the hands and fingers.  Also has arthritis in his hips and knees.   Skin:     General: Skin is warm and dry.      Comments: Dry coarse skin from tobacco use and also working outdoors in the cold.   Neurological:      General: No focal deficit present.      Mental Status: He is alert and oriented to person, place, and time. Mental status is at baseline.      Sensory: Sensory deficit present.      Comments: Patient does have paresthesias in the buttocks and legs from lower back problems.  Coordination and gait are stable.  Normal muscle strength upper  and lower extremities.   Psychiatric:         Behavior: Behavior normal.         Thought Content: Thought content normal.         Judgment: Judgment normal.      Comments: Patient has flat mood and affect.  Increased anxiety about being sick and having to work outdoors in the cold weather.  Thought content and judgment are stable.  No signs of vascular dementia.  Behavior is normal.     PLAN : Patient is a 53-year-old male who is evaluated today for acute maxillary sinusitis and acute tracheobronchitis.  He also is a smoker and I encouraged him to try to cut down or quit tobacco use it at all possible.  He will be started on Cipro 500 mg twice daily and I did recommend he take some probiotics to prevent diarrhea.  He also was given some Mucinex D to try once daily for the sinus congestion and the mucus drainage.  He also was given a shot of 4 mg Dexamethasone to help decrease the congestion in his sinuses.  Patient otherwise is stable and will follow-up as needed.  He states he will try to cut back on his tobacco use.    Problem List Items Addressed This Visit       Hypertension - Primary    Tobacco dependence    Acute recurrent maxillary sinusitis    Relevant Medications    ciprofloxacin (Cipro) 500 mg tablet    pseudoephedrine-guaifenesin (Mucinex D)  mg 12 hr tablet     Other Visit Diagnoses       Tracheobronchitis        Relevant Medications    ciprofloxacin (Cipro) 500 mg tablet    pseudoephedrine-guaifenesin (Mucinex D)  mg 12 hr tablet                 Jose Luis Cuenca DO

## 2024-03-04 DIAGNOSIS — F41.8 ANXIOUS DEPRESSION: ICD-10-CM

## 2024-03-04 RX ORDER — PAROXETINE HYDROCHLORIDE 20 MG/1
TABLET, FILM COATED ORAL
Qty: 90 TABLET | Refills: 3 | Status: SHIPPED | OUTPATIENT
Start: 2024-03-04

## 2024-05-03 DIAGNOSIS — I10 PRIMARY HYPERTENSION: ICD-10-CM

## 2024-05-03 RX ORDER — AMLODIPINE AND BENAZEPRIL HYDROCHLORIDE 5; 20 MG/1; MG/1
CAPSULE ORAL
Qty: 90 CAPSULE | Refills: 3 | Status: SHIPPED | OUTPATIENT
Start: 2024-05-03

## 2024-05-03 RX ORDER — CYCLOBENZAPRINE HCL 10 MG
TABLET ORAL
COMMUNITY
Start: 2024-04-23

## 2024-05-03 RX ORDER — AMLODIPINE BESYLATE AND ATORVASTATIN CALCIUM 10; 20 MG/1; MG/1
1 TABLET, FILM COATED ORAL
COMMUNITY

## 2024-05-03 RX ORDER — DEXTROMETHORPHAN HYDROBROMIDE, GUAIFENESIN 5; 100 MG/5ML; MG/5ML
LIQUID ORAL
COMMUNITY

## 2024-05-03 RX ORDER — METOPROLOL SUCCINATE 25 MG/1
TABLET, EXTENDED RELEASE ORAL
Qty: 90 TABLET | Refills: 3 | Status: SHIPPED | OUTPATIENT
Start: 2024-05-03

## 2024-06-03 DIAGNOSIS — K21.00 GASTROESOPHAGEAL REFLUX DISEASE WITH ESOPHAGITIS WITHOUT HEMORRHAGE: ICD-10-CM

## 2024-06-03 RX ORDER — PANTOPRAZOLE SODIUM 40 MG/1
40 TABLET, DELAYED RELEASE ORAL
Qty: 90 TABLET | Refills: 3 | Status: SHIPPED | OUTPATIENT
Start: 2024-06-03 | End: 2025-06-03

## 2024-06-03 RX ORDER — METHOCARBAMOL 750 MG/1
750 TABLET, FILM COATED ORAL 2 TIMES DAILY PRN
COMMUNITY
Start: 2024-05-22

## 2024-09-24 DIAGNOSIS — J41.1 MUCOPURULENT CHRONIC BRONCHITIS (MULTI): Primary | ICD-10-CM

## 2024-09-26 ENCOUNTER — OFFICE VISIT (OUTPATIENT)
Dept: URGENT CARE | Age: 54
End: 2024-09-26
Payer: COMMERCIAL

## 2024-09-26 VITALS
SYSTOLIC BLOOD PRESSURE: 134 MMHG | HEART RATE: 104 BPM | OXYGEN SATURATION: 95 % | DIASTOLIC BLOOD PRESSURE: 78 MMHG | RESPIRATION RATE: 16 BRPM | TEMPERATURE: 97.8 F

## 2024-09-26 DIAGNOSIS — N49.2 ABSCESS, SCROTUM: ICD-10-CM

## 2024-09-26 DIAGNOSIS — N48.1 BALANITIS: Primary | ICD-10-CM

## 2024-09-26 RX ORDER — DOXYCYCLINE 100 MG/1
100 CAPSULE ORAL 2 TIMES DAILY
Qty: 20 CAPSULE | Refills: 0 | Status: SHIPPED | OUTPATIENT
Start: 2024-09-26 | End: 2024-10-06

## 2024-09-26 RX ORDER — MICONAZOLE NITRATE 2 G/100G
POWDER TOPICAL 2 TIMES DAILY
Qty: 60 G | Refills: 0 | Status: SHIPPED | OUTPATIENT
Start: 2024-09-26 | End: 2024-10-03

## 2024-09-26 ASSESSMENT — ENCOUNTER SYMPTOMS
NAUSEA: 0
VOMITING: 0
DIFFICULTY URINATING: 0
DYSURIA: 0
CHILLS: 0
ABDOMINAL PAIN: 0
FEVER: 0
FREQUENCY: 0

## 2024-09-26 NOTE — PROGRESS NOTES
"Subjective   Patient ID: Julian Morgan is a 54 y.o. male. They present today with a chief complaint of Rash (Tip of penis red X 3 days. TD-MA).    History of Present Illness    Rash  Pertinent negatives include no fever or vomiting.       Patient reports the tip of his penis red and itchy for the past 3 days.  Also reports that he had a \"boil \"on his testicle that he did squeeze.  Reports that pus and blood did come out of it.  Denies any fever or chills.  Past Medical History  Allergies as of 09/26/2024 - Reviewed 02/15/2024   Allergen Reaction Noted    Celecoxib Unknown 03/26/2023       (Not in a hospital admission)       No past medical history on file.    Past Surgical History:   Procedure Laterality Date    SINUS SURGERY  07/09/2015    Sinus Surgery    TONSILLECTOMY  07/09/2015    Tonsillectomy        reports that he has been smoking cigarettes. He has a 22.5 pack-year smoking history. He has never used smokeless tobacco. He reports current alcohol use of about 24.0 standard drinks of alcohol per week. He reports that he does not use drugs.    Review of Systems  Review of Systems   Constitutional:  Negative for chills and fever.   Gastrointestinal:  Negative for abdominal pain, nausea and vomiting.   Genitourinary:  Positive for penile swelling. Negative for difficulty urinating, dysuria, frequency, penile discharge, penile pain, testicular pain and urgency.   Skin:  Positive for rash.                                  Objective    Vitals:    09/26/24 1306   BP: 134/78   Pulse: 104   Resp: 16   Temp: 36.6 °C (97.8 °F)   SpO2: 95%     No LMP for male patient.    Physical Exam  Constitutional:       Appearance: He is obese.   HENT:      Head: Normocephalic.   Eyes:      Conjunctiva/sclera: Conjunctivae normal.   Cardiovascular:      Rate and Rhythm: Regular rhythm.      Heart sounds: Normal heart sounds.   Pulmonary:      Breath sounds: Normal breath sounds.   Genitourinary:     Penis: Erythema present. No " tenderness, discharge, swelling or lesions.       Testes:         Left: Tenderness or swelling not present.      Comments: Left testicle with pustule. Area not fluctuant   Musculoskeletal:      Cervical back: Neck supple.   Neurological:      Mental Status: He is alert.         Procedures    Point of Care Test & Imaging Results from this visit  No results found for this visit on 09/26/24.   No results found.    Diagnostic study results (if any) were reviewed by JOSE EDUARDO Shine.    Assessment/Plan   Allergies, medications, history, and pertinent labs/EKGs/Imaging reviewed by JOSE EDUARDO Shine.     Medical Decision Making  Will start patient on miconazole powder and doxycycline.  Patient was told to apply warm compress to site 3-4 times a day to help with drainage.  He has a follow-up with his primary care doctor next week.    Orders and Diagnoses  Diagnoses and all orders for this visit:  Balanitis  -     miconazole (Micotin) 2 % powder; Apply topically 2 times a day for 7 days.  Abscess, scrotum  -     doxycycline (Vibramycin) 100 mg capsule; Take 1 capsule (100 mg) by mouth 2 times a day for 10 days. Take with at least 8 ounces (large glass) of water, do not lie down for 30 minutes after      Medical Admin Record      Patient disposition: Home    Electronically signed by JOSE EDUARDO Shine  1:30 PM

## 2024-09-26 NOTE — PATIENT INSTRUCTIONS
Take medications as directed.  Apply warm compress to area 3-4 times a day.    If you develop increased redness, swelling, high fevers, or increasing pain then go straight to the emergency room.  Keep your appointment with your PCP next week.

## 2024-09-27 ENCOUNTER — HOSPITAL ENCOUNTER (OUTPATIENT)
Dept: RADIOLOGY | Facility: CLINIC | Age: 54
Discharge: HOME | End: 2024-09-27
Payer: COMMERCIAL

## 2024-09-27 DIAGNOSIS — J41.1 MUCOPURULENT CHRONIC BRONCHITIS (MULTI): ICD-10-CM

## 2024-09-27 PROCEDURE — 71046 X-RAY EXAM CHEST 2 VIEWS: CPT

## 2024-10-02 ENCOUNTER — APPOINTMENT (OUTPATIENT)
Dept: PRIMARY CARE | Facility: CLINIC | Age: 54
End: 2024-10-02
Payer: COMMERCIAL

## 2024-10-02 VITALS
WEIGHT: 302 LBS | DIASTOLIC BLOOD PRESSURE: 85 MMHG | TEMPERATURE: 97.5 F | OXYGEN SATURATION: 95 % | RESPIRATION RATE: 20 BRPM | BODY MASS INDEX: 44.73 KG/M2 | SYSTOLIC BLOOD PRESSURE: 141 MMHG | HEIGHT: 69 IN | HEART RATE: 109 BPM

## 2024-10-02 DIAGNOSIS — R35.0 URINARY FREQUENCY: ICD-10-CM

## 2024-10-02 DIAGNOSIS — Z12.11 COLON CANCER SCREENING: ICD-10-CM

## 2024-10-02 DIAGNOSIS — Z12.5 PROSTATE CANCER SCREENING: ICD-10-CM

## 2024-10-02 DIAGNOSIS — J01.01 ACUTE RECURRENT MAXILLARY SINUSITIS: ICD-10-CM

## 2024-10-02 DIAGNOSIS — F17.200 TOBACCO DEPENDENCE: ICD-10-CM

## 2024-10-02 DIAGNOSIS — J40 BRONCHITIS: Primary | ICD-10-CM

## 2024-10-02 DIAGNOSIS — I10 PRIMARY HYPERTENSION: ICD-10-CM

## 2024-10-02 DIAGNOSIS — R53.83 FATIGUE, UNSPECIFIED TYPE: ICD-10-CM

## 2024-10-02 DIAGNOSIS — R79.89 LOW VITAMIN D LEVEL: ICD-10-CM

## 2024-10-02 DIAGNOSIS — E78.5 DYSLIPIDEMIA: ICD-10-CM

## 2024-10-02 DIAGNOSIS — K21.00 GASTROESOPHAGEAL REFLUX DISEASE WITH ESOPHAGITIS WITHOUT HEMORRHAGE: ICD-10-CM

## 2024-10-02 LAB
25(OH)D3 SERPL-MCNC: 66 NG/ML (ref 30–100)
ALBUMIN SERPL BCP-MCNC: 4.1 G/DL (ref 3.4–5)
ALP SERPL-CCNC: 61 U/L (ref 33–120)
ALT SERPL W P-5'-P-CCNC: 20 U/L (ref 10–52)
ANION GAP SERPL CALC-SCNC: 13 MMOL/L (ref 10–20)
AST SERPL W P-5'-P-CCNC: 30 U/L (ref 9–39)
BILIRUB SERPL-MCNC: 0.7 MG/DL (ref 0–1.2)
BUN SERPL-MCNC: 11 MG/DL (ref 6–23)
CALCIUM SERPL-MCNC: 9.6 MG/DL (ref 8.6–10.6)
CHLORIDE SERPL-SCNC: 94 MMOL/L (ref 98–107)
CHOLEST SERPL-MCNC: 224 MG/DL (ref 0–199)
CHOLESTEROL/HDL RATIO: 4.3
CO2 SERPL-SCNC: 33 MMOL/L (ref 21–32)
CREAT SERPL-MCNC: 0.94 MG/DL (ref 0.5–1.3)
EGFRCR SERPLBLD CKD-EPI 2021: >90 ML/MIN/1.73M*2
ERYTHROCYTE [DISTWIDTH] IN BLOOD BY AUTOMATED COUNT: 13.3 % (ref 11.5–14.5)
GLUCOSE SERPL-MCNC: 137 MG/DL (ref 74–99)
HCT VFR BLD AUTO: 49.6 % (ref 41–52)
HDLC SERPL-MCNC: 51.7 MG/DL
HGB BLD-MCNC: 16.2 G/DL (ref 13.5–17.5)
LDLC SERPL CALC-MCNC: 158 MG/DL
MCH RBC QN AUTO: 32.4 PG (ref 26–34)
MCHC RBC AUTO-ENTMCNC: 32.7 G/DL (ref 32–36)
MCV RBC AUTO: 99 FL (ref 80–100)
NON HDL CHOLESTEROL: 172 MG/DL (ref 0–149)
NRBC BLD-RTO: 0 /100 WBCS (ref 0–0)
PLATELET # BLD AUTO: 282 X10*3/UL (ref 150–450)
POTASSIUM SERPL-SCNC: 4.4 MMOL/L (ref 3.5–5.3)
PROT SERPL-MCNC: 7.1 G/DL (ref 6.4–8.2)
PSA SERPL-MCNC: 0.42 NG/ML
RBC # BLD AUTO: 5 X10*6/UL (ref 4.5–5.9)
SODIUM SERPL-SCNC: 136 MMOL/L (ref 136–145)
TRIGL SERPL-MCNC: 72 MG/DL (ref 0–149)
TSH SERPL-ACNC: 0.82 MIU/L (ref 0.44–3.98)
VLDL: 14 MG/DL (ref 0–40)
WBC # BLD AUTO: 7.4 X10*3/UL (ref 4.4–11.3)

## 2024-10-02 PROCEDURE — 84443 ASSAY THYROID STIM HORMONE: CPT

## 2024-10-02 PROCEDURE — 80053 COMPREHEN METABOLIC PANEL: CPT

## 2024-10-02 PROCEDURE — 3008F BODY MASS INDEX DOCD: CPT | Performed by: FAMILY MEDICINE

## 2024-10-02 PROCEDURE — 80061 LIPID PANEL: CPT

## 2024-10-02 PROCEDURE — 85027 COMPLETE CBC AUTOMATED: CPT

## 2024-10-02 PROCEDURE — 3077F SYST BP >= 140 MM HG: CPT | Performed by: FAMILY MEDICINE

## 2024-10-02 PROCEDURE — 99214 OFFICE O/P EST MOD 30 MIN: CPT | Performed by: FAMILY MEDICINE

## 2024-10-02 PROCEDURE — 82306 VITAMIN D 25 HYDROXY: CPT

## 2024-10-02 PROCEDURE — G0103 PSA SCREENING: HCPCS

## 2024-10-02 PROCEDURE — 3079F DIAST BP 80-89 MM HG: CPT | Performed by: FAMILY MEDICINE

## 2024-10-02 RX ORDER — OMEPRAZOLE 40 MG/1
40 CAPSULE, DELAYED RELEASE ORAL
Qty: 90 CAPSULE | Refills: 3 | Status: SHIPPED | OUTPATIENT
Start: 2024-10-02 | End: 2025-10-02

## 2024-10-02 RX ORDER — MOMETASONE FUROATE MONOHYDRATE 50 UG/1
1 SPRAY, METERED NASAL 2 TIMES DAILY
Qty: 17 G | Refills: 3 | Status: SHIPPED | OUTPATIENT
Start: 2024-10-02 | End: 2025-10-02

## 2024-10-02 RX ORDER — CIPROFLOXACIN 500 MG/1
500 TABLET ORAL 2 TIMES DAILY
Qty: 20 TABLET | Refills: 0 | Status: SHIPPED | OUTPATIENT
Start: 2024-10-02 | End: 2024-10-12

## 2024-10-02 ASSESSMENT — PAIN SCALES - GENERAL: PAINLEVEL: 0-NO PAIN

## 2024-10-02 NOTE — PROGRESS NOTES
Subjective   Julian Morgan is a 54 y.o. male who presents for Follow-up (Follow up visit, patient currently has a workman comp injury pending (back pain) also may have UTI and sinus infection).    HPI : Patient being evaluated for several problems and concerns.  He has been out of work for 5 months due to low back injury and Worker's Comp. case.  He has an upcoming appointment with orthopedics and he is following at a [facility] Worker's Comp. facility.  Patient also has some dysuria and chronic cough.  He has sinus congestion and has been smoking more now that he has not been working.  Patient did have a chest x-ray done that I had ordered prior to today's visit and it does show congestion and perihilar congestion consistent with bronchitis.  Patient will also give us a urine specimen to check.    Objective  : ROS : 10 systems were reviewed and the information is included in the HPI and no additional review of systems is indicated.    Physical Exam  Vitals and nursing note reviewed.   Constitutional:       Appearance: Normal appearance. He is obese.      Comments: Patient is alert and oriented x3.   No acute distress   HENT:      Head: Normocephalic.      Right Ear: Tympanic membrane and external ear normal.      Left Ear: Tympanic membrane and external ear normal.      Ears:      Comments: Ears are patent bilaterally and TMs are clear.     Nose: Congestion and rhinorrhea present.      Comments: Nasal congestion, drainage down the posterior pharynx.  Recurrent sinusitis.     Mouth/Throat:      Mouth: Mucous membranes are moist.      Pharynx: Oropharynx is clear.      Comments: Mouth is moist, tongue is midline.  No posterior pharyngeal erythema.  Eyes:      Extraocular Movements: Extraocular movements intact.      Conjunctiva/sclera: Conjunctivae normal.      Pupils: Pupils are equal, round, and reactive to light.      Comments: No visual disturbance, does have her eyes examined once a year.   Neck:       Comments: Restricted range of motion cervical spine due to body habitus.  Large broad neck.  No carotid bruits, no thyromegaly, no cervical adenopathy.    Cardiovascular:      Rate and Rhythm: Normal rate and regular rhythm.      Pulses: Normal pulses.      Heart sounds: Normal heart sounds.      Comments: Patient denies chest pain and no palpitations.  Heart rhythm is stable S1 and S2 are noted.  Pulmonary:      Effort: Pulmonary effort is normal.      Breath sounds: Rhonchi present.      Comments: Still smoking  and coughing.  Lungs with bilateral  rhonchi and congestion.  Chest xray reviewed and consistent with bronchitis.  Long talk about stopping tobacco use and cutting down.  Patient states that now he is not working and smoking more.  Abdominal:      General: Bowel sounds are normal. There is distension.      Comments: Abdomen is obese and difficult to evaluate due to obesity.  He would like to have a Cologuard test sent to his house.   Genitourinary:     Comments: Patient had a lumbar spine x-ray which showed possible right renal  calculus.  Recurrent back pains from herniated disc and lumbosacral spondylosis. Will recheck urine specimen.  Musculoskeletal:         General: Tenderness present. Normal range of motion.      Cervical back: Normal range of motion.      Right lower leg: Edema present.      Left lower leg: Edema present.      Comments: Has been out of work  for 5 months due to Lumbosacral strain, workers comp injury.  Follows at [facility].  Patient did have an MRI scan and x-rays and he is seeing an orthopedic specialist.  Also has age-related arthritis in his shoulders hips and knees.   Skin:     General: Skin is warm and dry.      Comments: There is no bruising, no erythema, no skin lesions noted, no rashes.   Neurological:      General: No focal deficit present.      Mental Status: He is alert and oriented to person, place, and time. Mental status is at baseline.       Comments: No focal neurosensory deficits are noted.  Patient denies any peripheral neuropathy.  Coordination and gait are stable.  Normal muscle strength upper and lower extremities.   Psychiatric:         Behavior: Behavior normal.         Thought Content: Thought content normal.         Judgment: Judgment normal.      Comments: Patient has increased anxiety and stress since he has not been working for 5 months.  He has also gained about 15 pounds.  Thought content and judgment are stable.  No signs of vascular dementia.     PLAN : Patient is a 54-year-old male who is in with complaints of sinus congestion, cough and some dysuria.  We did check a urine specimen and the pH was 7.0, specific gravity 1.020, negative leukocytes, negative protein, negative blood, negative glucose.  He also had a chest x-ray prior to his visit here and it does show increased perihilar vascular markings which are from acute bronchitis and his tobacco use.  I did encourage him to try to quit smoking.  Patient also had blood work drawn today and will be notified of the results in 3 days and further recommendations will be made at that time.  He was started on an antibiotic for his sinusitis and bronchitis.  He also needed a refill on his Nasonex nasal spray and his Prilosec for gastroesophageal reflux.  Patient has a Workmen's Comp. injury regarding his back and he is seeing orthopedics in a week or 2.  He has been out of work for 5 months and is gaining weight and smoking too much.  I hope that he can cut back on his tobacco use which would help his breathing and his chronic sinus congestion.  Patient will follow-up if there is no improvement.    Problem List Items Addressed This Visit    None           Jose Luis Cuenca, DO

## 2024-10-03 NOTE — RESULT ENCOUNTER NOTE
His sugar is up to 137     and he has to watch his diet    or he will become diabetic.   He needs to watch the carbohydrates and  the sweets   and try to lose weight.     Kidney and liver function are normal      cholesterol is 224 and should be under 200   bad cholesterol is 158 and should be under 100         triglycerides are normal at 72 thyroid function is normal           vitamin D is normal at 66      prostate level is normal 0.42      red and white blood cell counts are normal          he needs to get on a strict diet and try to lose weight and watch the sugar    so he does not become diabetic .   Also watch the cholesterol

## 2025-02-24 DIAGNOSIS — F41.8 ANXIOUS DEPRESSION: ICD-10-CM

## 2025-02-24 RX ORDER — PAROXETINE HYDROCHLORIDE 20 MG/1
TABLET, FILM COATED ORAL
Qty: 90 TABLET | Refills: 3 | Status: SHIPPED | OUTPATIENT
Start: 2025-02-24

## 2025-04-28 DIAGNOSIS — I10 PRIMARY HYPERTENSION: ICD-10-CM

## 2025-04-28 RX ORDER — METOPROLOL SUCCINATE 25 MG/1
25 TABLET, EXTENDED RELEASE ORAL DAILY
Qty: 90 TABLET | Refills: 3 | Status: SHIPPED | OUTPATIENT
Start: 2025-04-28

## 2025-04-28 RX ORDER — AMLODIPINE AND BENAZEPRIL HYDROCHLORIDE 5; 20 MG/1; MG/1
1 CAPSULE ORAL DAILY
Qty: 90 CAPSULE | Refills: 3 | Status: SHIPPED | OUTPATIENT
Start: 2025-04-28

## 2025-05-29 DIAGNOSIS — K21.00 GASTROESOPHAGEAL REFLUX DISEASE WITH ESOPHAGITIS WITHOUT HEMORRHAGE: ICD-10-CM

## 2025-05-30 RX ORDER — PANTOPRAZOLE SODIUM 40 MG/1
40 TABLET, DELAYED RELEASE ORAL
Qty: 90 TABLET | Refills: 3 | Status: SHIPPED | OUTPATIENT
Start: 2025-05-30